# Patient Record
Sex: FEMALE | Race: WHITE | NOT HISPANIC OR LATINO | URBAN - METROPOLITAN AREA
[De-identification: names, ages, dates, MRNs, and addresses within clinical notes are randomized per-mention and may not be internally consistent; named-entity substitution may affect disease eponyms.]

---

## 2022-10-27 ENCOUNTER — EMERGENCY (EMERGENCY)
Facility: HOSPITAL | Age: 37
LOS: 1 days | Discharge: HOME | End: 2022-10-27
Attending: EMERGENCY MEDICINE | Admitting: EMERGENCY MEDICINE

## 2022-10-27 VITALS
DIASTOLIC BLOOD PRESSURE: 71 MMHG | RESPIRATION RATE: 17 BRPM | TEMPERATURE: 98 F | SYSTOLIC BLOOD PRESSURE: 107 MMHG | OXYGEN SATURATION: 99 % | HEART RATE: 81 BPM
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
## 2022-10-27 DIAGNOSIS — Z23 ENCOUNTER FOR IMMUNIZATION: ICD-10-CM
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
## 2022-10-27 DIAGNOSIS — S62.631B DISPLACED FRACTURE OF DISTAL PHALANX OF LEFT INDEX FINGER, INITIAL ENCOUNTER FOR OPEN FRACTURE: ICD-10-CM

## 2022-10-27 DIAGNOSIS — S61.311A LACERATION WITHOUT FOREIGN BODY OF LEFT INDEX FINGER WITH DAMAGE TO NAIL, INITIAL ENCOUNTER: ICD-10-CM

## 2022-10-27 PROCEDURE — 99284 EMERGENCY DEPT VISIT MOD MDM: CPT | Mod: 25

## 2022-10-27 PROCEDURE — 73140 X-RAY EXAM OF FINGER(S): CPT | Mod: 26,LT,76

## 2022-10-27 PROCEDURE — 73120 X-RAY EXAM OF HAND: CPT | Mod: 26

## 2022-10-27 RX ORDER — OXYCODONE AND ACETAMINOPHEN 5; 325 MG/1; MG/1
2 TABLET ORAL ONCE
Refills: 0 | Status: DISCONTINUED | OUTPATIENT
Start: 2022-10-27 | End: 2022-10-27

## 2022-10-27 RX ORDER — CEPHALEXIN 500 MG
1 CAPSULE ORAL
Qty: 28 | Refills: 0
Start: 2022-10-27 | End: 2022-11-02

## 2022-10-27 RX ORDER — LIDOCAINE HCL 20 MG/ML
20 VIAL (ML) INJECTION ONCE
Refills: 0 | Status: COMPLETED | OUTPATIENT
Start: 2022-10-27 | End: 2022-10-27

## 2022-10-27 RX ORDER — CEFAZOLIN SODIUM 1 G
1000 VIAL (EA) INJECTION ONCE
Refills: 0 | Status: COMPLETED | OUTPATIENT
Start: 2022-10-27 | End: 2022-10-27

## 2022-10-27 RX ORDER — MORPHINE SULFATE 50 MG/1
4 CAPSULE, EXTENDED RELEASE ORAL ONCE
Refills: 0 | Status: DISCONTINUED | OUTPATIENT
Start: 2022-10-27 | End: 2022-10-27

## 2022-10-27 RX ORDER — TETANUS TOXOID, REDUCED DIPHTHERIA TOXOID AND ACELLULAR PERTUSSIS VACCINE, ADSORBED 5; 2.5; 8; 8; 2.5 [IU]/.5ML; [IU]/.5ML; UG/.5ML; UG/.5ML; UG/.5ML
0.5 SUSPENSION INTRAMUSCULAR ONCE
Refills: 0 | Status: COMPLETED | OUTPATIENT
Start: 2022-10-27 | End: 2022-10-27

## 2022-10-27 RX ADMIN — OXYCODONE AND ACETAMINOPHEN 2 TABLET(S): 5; 325 TABLET ORAL at 23:06

## 2022-10-27 RX ADMIN — Medication 100 MILLIGRAM(S): at 22:28

## 2022-10-27 RX ADMIN — Medication 20 MILLILITER(S): at 22:42

## 2022-10-27 RX ADMIN — TETANUS TOXOID, REDUCED DIPHTHERIA TOXOID AND ACELLULAR PERTUSSIS VACCINE, ADSORBED 0.5 MILLILITER(S): 5; 2.5; 8; 8; 2.5 SUSPENSION INTRAMUSCULAR at 22:28

## 2022-10-27 NOTE — ED PROVIDER NOTE - PATIENT PORTAL LINK FT
You can access the FollowMyHealth Patient Portal offered by [facility] by registering at the following website: [email or web address]. By joining [email or web address]’s FollowMyHealth portal, you will also be able to view your health information using other applications (apps) compatible with our system.

## 2022-10-27 NOTE — ED PROVIDER NOTE - PHYSICAL EXAMINATION
VITAL SIGNS: I have reviewed nursing notes and confirm.  CONSTITUTIONAL: Well-developed; well-nourished; in no acute distress.  SKIN: Laceration to Distal phalynx of left index finger  HEAD: Normocephalic; atraumatic.  EYES: EOM intact; conjunctiva and sclera clear.  ENT: nose appears normal  NECK: Supple  RESP: Breathing comfortably on RA  EXT: macerated distal left index finger  NEURO: Alert, oriented  PSYCH: Cooperative, appropriate.

## 2022-10-27 NOTE — ED PROVIDER NOTE - CARE PROVIDER_API CALL
Chetan Dugan)  Plastic Surgery; Surgery  [address] [place] [address]  Phone: [phone]  Fax: [phone]  Scheduled Appointment: 11/01/2022

## 2022-10-27 NOTE — ED PROVIDER NOTE - OBJECTIVE STATEMENT
37-year-old female presenting with a laceration to her left index finger.  She states she was at the gym and accidentally got her finger caught between weights on a machine.  This happened just prior to presenting to the ER.  She is right-hand dominant.  She reports her pain is a 5 out of 10.  She is not sure when her last tetanus shot was.

## 2022-10-27 NOTE — ED PROVIDER NOTE - NSFOLLOWUPINSTRUCTIONS_ED_ALL_ED_FT
Please take ibuprofen 600 mg 3 times a day as needed for pain.  For any breakthrough pain, you may use the prescribed pain medication.  Please be careful as it can make you drowsy.  Please keep the splint and dressing provided in place until you are seen for follow-up.

## 2022-10-27 NOTE — ED PROVIDER NOTE - CLINICAL SUMMARY MEDICAL DECISION MAKING FREE TEXT BOX
37-year-old female with a crush injury to the left index finger found to be an open displaced tuft fracture.  Discussed with Dr. Dugan from plastic who agreed to come in and repair the laceration.  Treated with a dose of Ancef and will obtain postreduction x-rays and plan for discharge on antibiotics and pain medication.

## 2022-10-27 NOTE — ED ADULT NURSE NOTE - CHIEF COMPLAINT QUOTE
Pt c/o laceration to left hand 2nd digit. Pt states her finger got "caught" in gym equipment. Large laceration to digit starting below nail-bed w/ distal discoloration noted. Nail bed appears bruised and cyanotic. Pt denies numbness or tingling to tip of digit. Pt reports unknown last tdap. Dressing applied. ED Dr Drake made aware.

## 2025-01-02 NOTE — ED ADULT NURSE NOTE - ISOLATION TYPE:
Per Seema's result note:Seema Peña, MATTHEW  P LAUREL Peña Np Pul Nurse Msg Pool  Please inform patient hat CXR was unremarkable. I will add my pre-op risk stratification for upcoming surgery.  Follow up as planned.  Thank you, Seema  Pt's caregiver aware of results   None